# Patient Record
Sex: FEMALE | Race: WHITE | HISPANIC OR LATINO | Employment: FULL TIME | URBAN - METROPOLITAN AREA
[De-identification: names, ages, dates, MRNs, and addresses within clinical notes are randomized per-mention and may not be internally consistent; named-entity substitution may affect disease eponyms.]

---

## 2018-10-23 ENCOUNTER — TELEPHONE (OUTPATIENT)
Dept: SPORTS MEDICINE | Facility: CLINIC | Age: 48
End: 2018-10-23

## 2018-10-23 NOTE — TELEPHONE ENCOUNTER
----- Message from Hawa Ramachandran MA sent at 10/22/2018  7:29 PM CDT -----  Can you move this patient off our schedule. They patient can see a PA

## 2018-10-23 NOTE — TELEPHONE ENCOUNTER
Called patient and LVM in regards to the appointment she had set up with us. Asked her to call back when she gets a chance.

## 2018-10-24 ENCOUNTER — OFFICE VISIT (OUTPATIENT)
Dept: SPORTS MEDICINE | Facility: CLINIC | Age: 48
End: 2018-10-24
Payer: COMMERCIAL

## 2018-10-24 ENCOUNTER — HOSPITAL ENCOUNTER (OUTPATIENT)
Dept: RADIOLOGY | Facility: HOSPITAL | Age: 48
Discharge: HOME | End: 2018-10-24
Attending: PHYSICIAN ASSISTANT
Payer: COMMERCIAL

## 2018-10-24 VITALS
HEART RATE: 79 BPM | SYSTOLIC BLOOD PRESSURE: 113 MMHG | DIASTOLIC BLOOD PRESSURE: 71 MMHG | BODY MASS INDEX: 22.22 KG/M2 | WEIGHT: 150 LBS | HEIGHT: 69 IN

## 2018-10-24 DIAGNOSIS — G56.01 CARPAL TUNNEL SYNDROME, RIGHT: ICD-10-CM

## 2018-10-24 DIAGNOSIS — S86.892A LEFT MEDIAL TIBIAL STRESS SYNDROME, INITIAL ENCOUNTER: ICD-10-CM

## 2018-10-24 DIAGNOSIS — M25.532 BILATERAL WRIST PAIN: ICD-10-CM

## 2018-10-24 DIAGNOSIS — M25.531 BILATERAL WRIST PAIN: Primary | ICD-10-CM

## 2018-10-24 DIAGNOSIS — M25.531 BILATERAL WRIST PAIN: ICD-10-CM

## 2018-10-24 DIAGNOSIS — M25.532 BILATERAL WRIST PAIN: Primary | ICD-10-CM

## 2018-10-24 PROCEDURE — 3008F BODY MASS INDEX DOCD: CPT | Mod: CPTII,S$GLB,, | Performed by: PHYSICIAN ASSISTANT

## 2018-10-24 PROCEDURE — 73110 X-RAY EXAM OF WRIST: CPT | Mod: 26,50,, | Performed by: RADIOLOGY

## 2018-10-24 PROCEDURE — 99999 PR PBB SHADOW E&M-EST. PATIENT-LVL IV: CPT | Mod: PBBFAC,,, | Performed by: PHYSICIAN ASSISTANT

## 2018-10-24 PROCEDURE — 73110 X-RAY EXAM OF WRIST: CPT | Mod: 50,TC,FY,PO

## 2018-10-24 PROCEDURE — 99204 OFFICE O/P NEW MOD 45 MIN: CPT | Mod: S$GLB,,, | Performed by: PHYSICIAN ASSISTANT

## 2018-10-24 RX ORDER — MELOXICAM 15 MG/1
15 TABLET ORAL DAILY
Qty: 30 TABLET | Refills: 2 | Status: SHIPPED | OUTPATIENT
Start: 2018-10-24

## 2018-10-24 NOTE — PROGRESS NOTES
Subjective:          Chief Complaint: Yi Smith is a 48 y.o. female who had concerns including Pain of the Left Wrist; Pain of the Right Wrist; and Pain of the Left Lower Leg.    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 She reports training for 6-8 hours a day and started to feel calf pain and wrist pain after playing. When she returned after a month of training she took rest but pain returned after playing for an hour. She reports and is becoming progressively worse. Pain is located over (points to) snuff box and posterior tibial tibialis. She reports that the pain is a 3 /10 aching and throbbing pain today and not responding adequately to conservative measures which have included activity modifications, dry needling, rest, and oral medication (2 weeks). Is affecting ADLs and limiting desired level of activity. Denies numbness, tingling, radiation, and inability to bear weight. Denies neck pain.  Pain is 10 /10 at its worst    Mechanical symptoms: none  Subjective instability: (--)   orse with impact activity  Better with rest.   Nocturnal symptoms: (--)    No previous surgeries or trauma on wrist or calf              Review of Systems   Constitution: Negative for chills and fever.   HENT: Negative for congestion and sore throat.    Eyes: Negative for discharge and double vision.   Cardiovascular: Negative for chest pain, palpitations and syncope.   Respiratory: Negative for cough and shortness of breath.    Endocrine: Negative for cold intolerance and heat intolerance.   Skin: Negative for dry skin and rash.   Musculoskeletal: Positive for joint pain and joint swelling. Negative for falls, gout, muscle cramps, muscle weakness, myalgias and neck pain.   Gastrointestinal: Negative for abdominal pain, nausea and vomiting.   Neurological: Negative for focal weakness, numbness and paresthesias.       Pain Related Questions  Over the past 3  days, what was your average pain during activity? (I.e. running, jogging, walking, climbing stairs, getting dressed, ect.): 10  Over the past 3 days, what was your highest pain level?: 10  Over the past 3 days, what was your lowest pain level? : 3    Other  How many nights a week are you awakened by your affected body part?: 1  Was the patient's HEIGHT measured or patient reported?: Patient Reported  Was the patient's WEIGHT measured or patient reported?: Measured      Objective:        General: Yi  is well-developed, well-nourished, appears stated age, in no acute distress, alert and oriented to time, place and person.     General    Vitals reviewed.  Constitutional: She is oriented to person, place, and time. She appears well-developed and well-nourished. No distress.   HENT:   Head: Normocephalic and atraumatic.   Nose: Nose normal.   Eyes: Conjunctivae and EOM are normal. Pupils are equal, round, and reactive to light.   Cardiovascular: Normal rate, regular rhythm and intact distal pulses.    Pulmonary/Chest: Breath sounds normal. No respiratory distress.   Abdominal: Soft. Bowel sounds are normal. She exhibits no distension.   Neurological: She is alert and oriented to person, place, and time. She has normal reflexes.   Psychiatric: She has a normal mood and affect. Her behavior is normal. Judgment and thought content normal.     General Musculoskeletal Exam   Gait: normal     Left Ankle/Foot Exam     Range of Motion   Ankle Joint  Dorsiflexion: normal   Plantar flexion: normal     Subtalar Joint   Inversion: normal   Eversion: normal   Talbert Test:  normal    Tests   Heel Walk: able to perform  Tiptoe Walk: able to perform  Single Heel Rise: able to perform  Squeeze Test: absent    Other   Sensation: normal  Peroneal Subluxation: negative    Comments:  +TTP over posterior tibial tibialis      Right Hand/Wrist Exam     Pain   Wrist - The patient exhibits pain of the scapholunate/lunate  ECU.    Tenderness   The patient is tender to palpation of the snuff box.    Tests   Phalens sign: positive  Tinel's sign (median nerve): positive  Carpal Tunnel Compression Test: positive        Left Hand/Wrist Exam     Pain   Wrist - The patient exhibits pain of the scapholunate/lunate ECU.    Tenderness   The patient is tender to palpation of the snuff box.     Tests   Phalens sign: positive  Carpal Tunnel Compression Test: positive            Muscle Strength   Right Upper Extremity   : 5/5/5   Left Upper Extremity  :  5/5/5     Vascular Exam       Left Pulses  Dorsalis Pedis:      2+  Posterior Tibial:      2+        Edema  Left Lower Leg: absent    Radiographic Findings 10/24/2018:    XR WRIST COMPLETE 3 VIEWS BILATERAL    CLINICAL HISTORY:  Pain in right wrist    TECHNIQUE:  PA, lateral, and oblique views of both wrists were performed.    COMPARISON:  None    FINDINGS:  On the right mild narrowing at the radiocarpal joint.  Otherwise alignment is satisfactory.    On the left alignment is satisfactory.  No fracture.    Xrays of the bilateral wrist were ordered and reviewed by me today. These findings were discussed and reviewed with the patient.          Assessment:       Encounter Diagnoses   Name Primary?    Bilateral wrist pain Yes    Left medial tibial stress syndrome, initial encounter     Carpal tunnel syndrome, right           Plan:       1. Mobic 15 mg 1 time daily PRN for pain management. Patient understands to take with food and/or OTC prilosec to decrease GI side effects.  2. Ambulatory referral to physical therapy for shin splints protocol.  3. [facility], Dr. Blount for carpel tunnel evaluation.  4. Ice compress to the affected area 2-3x a day for 15-20 minutes as needed for pain management.  5. RTC to see Ashkan Rogel PA-C as needed for follow-up.      All of the patient's questions were answered and the patient will contact us if they have any questions or  concerns in the interim.          Patient questionnaires may have been collected.

## 2018-10-29 ENCOUNTER — TELEPHONE (OUTPATIENT)
Dept: SPORTS MEDICINE | Facility: CLINIC | Age: 48
End: 2018-10-29

## 2018-10-29 NOTE — TELEPHONE ENCOUNTER
Spoke with Pt to give her the phone number to Central Scheduling for Physical Therapy in order to schedule with Larry  and the contact number for [facility] to schedule with Dr. Blount.

## 2018-11-01 ENCOUNTER — OFFICE VISIT (OUTPATIENT)
Dept: ORTHOPEDICS | Facility: CLINIC | Age: 48
End: 2018-11-01
Payer: COMMERCIAL

## 2018-11-01 ENCOUNTER — CLINICAL SUPPORT (OUTPATIENT)
Dept: REHABILITATION | Facility: HOSPITAL | Age: 48
End: 2018-11-01
Attending: PHYSICIAN ASSISTANT
Payer: COMMERCIAL

## 2018-11-01 VITALS
HEIGHT: 69 IN | WEIGHT: 149.94 LBS | SYSTOLIC BLOOD PRESSURE: 104 MMHG | BODY MASS INDEX: 22.21 KG/M2 | DIASTOLIC BLOOD PRESSURE: 62 MMHG

## 2018-11-01 DIAGNOSIS — M79.662 PAIN OF LEFT LOWER LEG: ICD-10-CM

## 2018-11-01 DIAGNOSIS — M62.81 MUSCLE WEAKNESS OF LOWER EXTREMITY: ICD-10-CM

## 2018-11-01 DIAGNOSIS — M25.531 RIGHT WRIST PAIN: Primary | ICD-10-CM

## 2018-11-01 PROCEDURE — 97161 PT EVAL LOW COMPLEX 20 MIN: CPT | Performed by: PHYSICAL THERAPIST

## 2018-11-01 PROCEDURE — 3008F BODY MASS INDEX DOCD: CPT | Mod: CPTII,S$GLB,, | Performed by: ORTHOPAEDIC SURGERY

## 2018-11-01 PROCEDURE — 99203 OFFICE O/P NEW LOW 30 MIN: CPT | Mod: 25,S$GLB,, | Performed by: ORTHOPAEDIC SURGERY

## 2018-11-01 PROCEDURE — 20605 DRAIN/INJ JOINT/BURSA W/O US: CPT | Mod: RT,S$GLB,, | Performed by: ORTHOPAEDIC SURGERY

## 2018-11-01 PROCEDURE — 97110 THERAPEUTIC EXERCISES: CPT | Performed by: PHYSICAL THERAPIST

## 2018-11-01 PROCEDURE — 99999 PR PBB SHADOW E&M-EST. PATIENT-LVL III: CPT | Mod: PBBFAC,,, | Performed by: ORTHOPAEDIC SURGERY

## 2018-11-01 RX ORDER — DEXAMETHASONE SODIUM PHOSPHATE 4 MG/ML
4 INJECTION, SOLUTION INTRA-ARTICULAR; INTRALESIONAL; INTRAMUSCULAR; INTRAVENOUS; SOFT TISSUE
Status: DISCONTINUED | OUTPATIENT
Start: 2018-11-01 | End: 2018-11-01 | Stop reason: HOSPADM

## 2018-11-01 RX ADMIN — DEXAMETHASONE SODIUM PHOSPHATE 4 MG: 4 INJECTION, SOLUTION INTRA-ARTICULAR; INTRALESIONAL; INTRAMUSCULAR; INTRAVENOUS; SOFT TISSUE at 12:11

## 2018-11-01 NOTE — PLAN OF CARE
"[facility]  Physical Therapy Initial Evaluation    Name: Yi Smith  Clinic Number: [id number]    Therapy Diagnosis:   Encounter Diagnoses   Name Primary?    Pain of left lower leg     Muscle weakness of lower extremity      Physician: Chadwick Rogel, *    Physician Orders: PT Eval and Treat   Medical Diagnosis: S86.892A (ICD-10-CM) - Left medial tibial stress syndrome, initial encounter  Evaluation Date: 11/1/2018  Authorization Period Expiration: 12/31/2018  Plan of Care Certification Period:  2/1/2019  Visit # / Visits authorized: 1 / 20    Time In: 13:30  Time Out: 14:30  Total Billable Time: 60 minutes    Precautions: Standard    Subjective   Date of onset: 3/2018  History of current condition - Thao reports attending tennis camp at [facility] in [place] for 5 days, 8 hours / day,  Noting that on day 4 she felt "cramping" in her L calf, went to "nursery" for examination, iced after practice and was able to complete day 5 activities, however, since returning home, sxs have persisted, she did see DC who performed US, and held her out of tennis, pt also attended PT x 2 visits receiving dry needling which made her condition worsen, she has had multiple episodes again what she describes as a "cramp" descending stairs, MRI (-) and now referred to this PT for consult      Pt currently returned to tennis 3x/week, but after further conversation, it turns out pt also works out weight training in morning at [facility], "whole body"  So basically doing two a day workouts, and was also getting in sauna     No past medical history on file.  Yi Smith  has no past surgical history on file.    Yi  has a current medication list which includes the following prescription(s): meloxicam.    Review of patient's allergies indicates:  No Known Allergies     Pain:  Current 0/10, worst 0/10, best 0/10   Location: left calf region    Description: Aching, Sharp and "It " "feels like it's going to cramp" very apprehensive   Aggravating Factors: tennis   Easing Factors: rest    Prior Therapy: yes  Social History:  NA   Occupation:   Prior Level of Function: I  Current Level of Function: I but restrictions for rec/leisure activity     Pts goals: "heal as fast as I can"     Objective     Observation:  Unremarkable, no swelling, no muscle atrophy, neg gait deviation  There may be small bruise present in medial head of gastroc upper to mid 1/3 region     Range of Motion (Passive):   Ankle Right Left   Dorsiflexion WFLs  WFLs   Plantarflexion WFLs WFLs   Inversion  WFLs WFLs   Eversion WFLs WFLs     Strength: Ankle   Right Left Comment   Dorsiflexion 5/5 5/5    Plantarflexion 7 U HR  3 UR  Poor to fair quality and k' bent on each leg   Inversion 5/5 5/5    Eversion  5/5    5/5         Special Tests: neg Talbert test, fair balance L worse than R  Decreased flexibility B RF, (+) U bridge test R/L     Palpation: (-) TTP t/o left gastroc, pt feels she is tender along tibial region medially     TREATMENT     Treatment Time In: 14:00  Treatment Time Out: 14:30   Total Treatment time separate from Evaluation time:30'    Thao received therapeutic exercises to develop strength, endurance, flexibility and core stabilization for 30 minutes including:    Stick lower leg  U HR 3x10 R/L   Supine RF stretch 5x;15 R/L  Butt winking 1x15:05   B bridge 2x10;10   Plank 3x:20     rev'd hydration  rev'd not performing 2 a day work outs, pt to play tennis M,W, F and work out T, Th, Sat, Sun off  rev'd not using sauna or hot tub x 2 weeks     Education     Home Exercises and Patient Education Provided    Education provided re:   - progress towards goals   - role of therapy in multi - disciplinary team, goals for therapy  No spiritual or educational barriers to learning provided    Written Home Exercises Provided:   Exercises were reviewed and Thao was able to demonstrate them prior to " "the end of the session.   Pt received a written copy of exercises to perform at home. Thao demonstrated good  understanding of the education provided.     Assessment   Yi  is a 47 y.o. female referred to outpatient Physical Therapy with a medical diagnosis of medial tibial stress syndrome. Pt presents with     "cramping" feeling in left gastroc  Decreased flexibility B hips  Decreased strength/activation gastroc and glut    Pt prognosis is Good.   Pt will benefit from skilled outpatient Physical Therapy to address the deficits stated above and in the chart below, provide pt/family education, and to maximize pt's level of independence.     Plan of care discussed with patient: Yes  Pt's spiritual, cultural and educational needs considered and pt agreeable to plan of care and goals as stated below:     Anticipated Barriers for therapy: none    Medical Necessity is demonstrated by the following  History  Co-morbidities and personal factors that may impact the plan of care Co-morbidities:   none    Personal Factors:   no deficits     low   Examination  Body Structures and Functions, activity limitations and participation restrictions that may impact the plan of care Body Regions:   lower extremities    Body Systems:    strength  balance    Participation Restrictions:   Tennis     Activity limitations:   Mobility  no deficits    Self care  no deficits    Domestic Life  no deficits    Community and Social Life  recreation and leisure         low   Clinical Presentation stable and uncomplicated low   Decision Making/ Complexity Score: low     Goals     Short-Term Goals: 6 weeks  - The patient will be independent with initial home exercise program.  -  The patient will increase PF strength to be able to perform 10 U HRs   Long-Term Goals: 12 weeks  - The patient will be independent with home exercise program and symptom management.  - The patient will be independent amb with no assistive device on all surfaces for " community distances.  -- The patient will increase strength in glut/core to be able to perform U bridge without hamstring activation to perform tennis  with pain < 0/10  The patient will be able to perform SLS EC > 10 seconds to return to her 100% preinjury level of function and decrease her fall risk .      Plan   Certification Period: 11/1/2018 to 2/1/2019.    Outpatient Physical Therapy 1 times weekly for 3 months to include the following interventions: patient education, Manual Therapy, Moist Heat/ Ice and Neuromuscular Re-ed. therex and therapeuitc activities      Larry Stone, PT

## 2018-11-01 NOTE — PROCEDURES
Intermediate Joint Aspiration/Injection: R radiocarpal  Date/Time: 11/1/2018 12:12 PM  Performed by: Tae Blount MD  Authorized by: Tae Blount MD     Consent Done?: Yes (Verbal)  Indications: Pain  Site marked: The procedure site was marked    Timeout: Prior to procedure the correct patient, procedure, and site was verified      Location:  Wrist  Site:  R radiocarpal  Prep: Patient was prepped and draped in usual sterile fashion    Ultrasonic Guidance for needle placement: No  Needle size:  25 G  Approach:  Dorsal  Medications:  4 mg dexamethasone 4 mg/mL  Patient tolerance:  Patient tolerated the procedure well with no immediate complications

## 2018-11-01 NOTE — PROGRESS NOTES
[facility]  History & Physical  Orthopedics    SUBJECTIVE:      Chief Complaint: Right Wrist Pain    Referring Provider: [facility]       History of Present Illness:  Patient is a 47 y.o. right hand dominant female who presents today with complaints of dorsal right wrist pain. The pain started in March while at [place], [place]. She reports training for 6-8 hours a day.  Since then she has been playing on and off having to stop for extended periods because of the pain.   She reports and is becoming progressively worse. She reports that the pain is a 3 /10 aching and throbbing pain today and not responding adequately to conservative measures which have included activity modifications, dry needling, rest, and oral medication (2 weeks).  She does also mention that occasionally when she wakes up she has tingling in her long and index finger of her right hand.  Pain is 10 /10 at its worst    Onset of symptoms/DOI was March.    Symptoms are aggravated by activity.    Symptoms are alleviated by rest.    Symptoms consist of pain.    The patient rates their pain as a 3/10.    Attempted treatment(s) and/or interventions include rest, activity modification and anti-inflammatory medications.     The patient denies any fevers, chills, N/V, D/C and presents for evaluation.       No past medical history on file.  No past surgical history on file.  Review of patient's allergies indicates:  No Known Allergies  Social History     Social History Narrative    Not on file     No family history on file.      Current Outpatient Medications:     meloxicam (MOBIC) 15 MG tablet, Take 1 tablet (15 mg total) by mouth once daily., Disp: 30 tablet, Rfl: 2      Review of Systems:  Constitutional: no fever or chills  Eyes: no visual changes  ENT: no nasal congestion or sore throat  Respiratory: no cough or shortness of breath  Cardiovascular: no chest pain  Gastrointestinal: no nausea or vomiting, tolerating  "diet  Musculoskeletal: myalgias    OBJECTIVE:      Vital Signs (Most Recent):  Vitals:    11/01/18 1114   BP: 104/62   BP Location: Right arm   Patient Position: Sitting   BP Method: Medium (Automatic)   Weight: 68 kg (149 lb 14.6 oz)   Height: 5' 9" (1.753 m)     Body mass index is 22.14 kg/m².      Physical Exam:  Constitutional: The patient appears well-developed and well-nourished. No distress.   Head: Normocephalic and atraumatic.   Nose: Nose normal.   Eyes: Conjunctivae and EOM are normal.   Neck: No tracheal deviation present.   Cardiovascular: Normal rate and intact distal pulses.    Pulmonary/Chest: Effort normal. No respiratory distress.   Abdominal: There is no guarding.   Neurological: The patient is alert.   Psychiatric: The patient has a normal mood and affect.     Right Hand/Wrist Examination:    Observation/Inspection:  Swelling  none    Deformity  none  Discoloration  none     Scars   none    Atrophy  none    HAND/WRIST EXAMINATION:  Finkelstein's Test   Neg  Snuff box tenderness   Neg  Tinajero's Test    Neg  Hook of Hamate Tenderness  Neg  CMC grind    Neg  Circumduction test   Neg    Neurovascular Exam:  Digits WWP, brisk CR < 3s throughout  NVI motor/LTS to M/R/U nerves, radial pulse 2+  Tinel's Test - Carpal Tunnel  Neg  Tinel's Test - Cubital Tunnel  Neg  Phalen's Test    Neg  Durkan's    POSITIVE    ROM hand/wrist/elbow full, painless      Diagnostic Results:     Xray - No acute fracture      ASSESSMENT/PLAN:      47 y.o. yo female with mostly dorsal right wrist pain that does radiate to the volar wrist and hand likely de quervain's tenosynovitis.    1) We will do a steroid injection today as the patient is going to [place] and would like to have some relief before playing tennis there.  2) When she comes back we will get her started in some OT  3) Continue bracing and NSAIDs        Tae Blount M.D.      "

## 2018-11-12 ENCOUNTER — CLINICAL SUPPORT (OUTPATIENT)
Dept: REHABILITATION | Facility: HOSPITAL | Age: 48
End: 2018-11-12
Attending: ORTHOPAEDIC SURGERY
Payer: COMMERCIAL

## 2018-11-12 ENCOUNTER — CLINICAL SUPPORT (OUTPATIENT)
Dept: REHABILITATION | Facility: HOSPITAL | Age: 48
End: 2018-11-12
Attending: PHYSICIAN ASSISTANT
Payer: COMMERCIAL

## 2018-11-12 DIAGNOSIS — M62.81 MUSCLE WEAKNESS OF LOWER EXTREMITY: ICD-10-CM

## 2018-11-12 DIAGNOSIS — M79.662 PAIN OF LEFT LOWER LEG: Primary | ICD-10-CM

## 2018-11-12 DIAGNOSIS — M79.601 RIGHT ARM PAIN: Primary | ICD-10-CM

## 2018-11-12 PROCEDURE — 97110 THERAPEUTIC EXERCISES: CPT | Performed by: PHYSICAL THERAPIST

## 2018-11-12 PROCEDURE — 97165 OT EVAL LOW COMPLEX 30 MIN: CPT

## 2018-11-12 PROCEDURE — 97110 THERAPEUTIC EXERCISES: CPT

## 2018-11-12 NOTE — PROGRESS NOTES
"  Physical Therapy Daily Treatment Note     Visit Date: 11/12/2018    Name: Yi Smith  Clinic Number: [id number]    Therapy Diagnosis:   Encounter Diagnoses   Name Primary?    Pain of left lower leg Yes    Muscle weakness of lower extremity      Physician: Chadwick Rogel, *      Physician Orders: PT Eval and Treat   Medical Diagnosis: S86.892A (ICD-10-CM) - Left medial tibial stress syndrome, initial encounter  Evaluation Date: 11/1/2018  Authorization Period Expiration: 12/31/2018  Plan of Care Certification Period:  2/1/2019  Visit # / Visits authorized: 1 / 20    Time In: 13:30  Time Out: 14:30  Total Billable Time: 60 minutes    Precautions: Standard    Subjective      Pt reports:improvement in her L calf condition, now able to play for 1 hour without sxs in calf region     she was compliant with home exercise program given last session.   Response to previous treatment:good   Functional change: see above     Pain: 0/10  Location: left gastroc region       Objective     Measurements taken:  Still with difficulty performing single leg calf raises    Thao received therapeutic exercises to develop strength, endurance, ROM and flexibility for 45 minutes including:    Stick upper/ lower leg  Functional GSS 2x:05 R/L  "  SS 2x5:05 R/L  U HR 3xburn  Seated HR MR 3x15  A' TB DF green 3xburn   A' TB Inv 2x30  A' TB Ev 3xburn   U bridge 3x5:05   Hip abd band walks 3xburn R/L green loop band       Home Exercises Provided and Patient Education Provided     Education provided:   - none    Written Home Exercises Provided: Patient instructed to cont prior HEP.  Exercises were reviewed and hTao was able to demonstrate them prior to the end of the session.  Thao demonstrated good  understanding of the education provided.     See EMR under hand out  for exercises provided prior visit.      Assessment     swetha Rx without increase in sxs very good training effect in L gastroc region     Thao " is progressing well towards her goals.   Pt prognosis is Good.     Pt will continue to benefit from skilled outpatient physical therapy to address the deficits listed in the problem list box on initial evaluation, provide pt/family education and to maximize pt's level of independence in the home and community environment.     Pt's spiritual, cultural and educational needs considered and pt agreeable to plan of care and goals.    Anticipated barriers to physical therapy: none    Goals:     Short-Term Goals: 6 weeks  - The patient will be independent with initial home exercise program.  -  The patient will increase PF strength to be able to perform 10 U HRs   Long-Term Goals: 12 weeks  - The patient will be independent with home exercise program and symptom management.  - The patient will be independent amb with no assistive device on all surfaces for community distances.  -- The patient will increase strength in glut/core to be able to perform U bridge without hamstring activation to perform tennis  with pain < 0/10  The patient will be able to perform SLS EC > 10 seconds to return to her 100% preinjury level of function and decrease her fall risk .      Plan     Continue with established Plan of Care towards PT goals with focus on decreasing pain, increasing ROM, strength, neuromuscular control and functional status       Larry Stone, PT

## 2018-11-14 NOTE — PLAN OF CARE
[facility] Occupational Therapy  Initial Evaluation     Name: Yi Smith  Clinic Number: [id number]    Therapy Diagnosis:  Right wrist pain   Encounter Diagnosis   Name Primary?    Right arm pain Yes     Physician: Tae Blount MD    Physician Orders: right wrist pain , eval and treat   Medical Diagnosis: wrist pain , right   Surgical Procedure/ Date :none   Evaluation Date: 11/12/2018  Insurance:[facility]  Insurance Authorization period Expiration: 12/31/18   Plan of Care Certification Period: 12/31/18     Visit # / Visits Authortized: 1/20  Time In:11;00  Time Out: 12:00  Total Billable Time: 60 minutes    Precautions: Standard    Subjective     Involved Side:  right  Dominant Side: Right  Date of Onset:   2 months ago   Mechanism of Injury: pt reports that 48yo former professional raquetball player, 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  Went to [facility] to learn the sport and played 8 hours a day x 5 days.  New onset right wrist pain thereafter.  Had MRI at outside facility reportedly negative.  Chiropractor treatment helped temporarily.  MD reports pt  with CT symptoms, (+) anjelica's on right but does not want EMG at this time.  Will try wrist injection, brace, OT referral.  Cont mobic.  F/U 2 months.        History of Current Condition:  She has dropped down to 2 times a week to play tennis .She reports that the pain is a 3 /10 aching and throbbing pain today and not responding adequately to conservative measures which have included activity modifications, dry needling, rest, and oral medication (2 weeks).  She does also mention that occasionally when she wakes up she has tingling in her long and index finger of her right hand.  Pain is 10 /10 at its worst      Imaging: MRI studies outside facility   Previous Therapy: had dry needling     Patient's Goals for Therapy: pain free to be able to play intense tennis     Pain:  Functional Pain Scale Rating 0-10:   10/10 on average  8/10 at  best  10/10 at worst  Locationright: dorsal wrist   Description: Throbbing  Aggravating Factors: Bending  Easing Factors: relaxation    Occupation:   Plays tennis and just started piano   Working presently: unemployed  Duties:  Plays tennis several days a week      Functional Limitations/Social History:    Previous functional status includes: Independent with all ADLs.     Current FunctionalStatus   Home/Living environment : lives with their family      Limitation of Functional Status as follows:   ADLs/IADLs:     - Feeding:Not Necessary    - Bathing:Not Necessary    - Dressing/Grooming: Not Necessary    - Driving: Not Necessary     Leisure: Sports: tennis       Past Medical History/Physical Systems Review:   Yi Smith  has no past medical history on file.    Yi Smith  has no past surgical history on file.    Yi  has a current medication list which includes the following prescription(s): meloxicam.    Review of patient's allergies indicates:  No Known Allergies       Objective     Observation/Appearance:  Skin intact     Edema. Measured in centimeters. No edema noted       Hand ROM. Measured in degrees.   11/12/2018 11/12/2018       Left Right              Index: MP   wnl/                 PIP      wnl/                 DIP  wnl/                 CONNOR                Long:  MP  wnl                 PIP  wnl                 DIP  wnl                 CONNOR                Ring:   MP  wnl                 PIP  wnl                 DIP  wnl                 CONNOR                Small:  MP  wnl                  PIP  wnl                  DIP  wnl                 CONNOR                Thumb: MP  wnl                   IP  wnl          Rad ADD/ABD  wnl          Pal ADD/ABD  wnl          Opposition                  WRIST          flexion  55      Ext   55      RD  20      UD  20      Pro  90      Sup   90                             Sensation  Median Nerve Distribution 11/12/2018 11/12/2018    Left  Right   [place] Justino     Normal 1.65-2.83 X X   Diminished Light Touch 3.22-3.61     Diminished Protective 3.84-4.31     Loss of Protective 4.56-6.65     Untestable >6.65     2 Point Discrimination     Static     Dynamic       Special Tests:   Right   11/12/2018   Thumb CMC Grind Test    Finkelstein's Test    Phalen's Test    Tinel's Test X   Vincent's Test    Extrinsic Tightness Test    Intrinsic Tightness Test    ORL Test    Froment's Sign    Mamie's Sign     E[place]awa Sign     Clamp Sign     Scaphoid Thrust Test    Linscheid's Test    Metacarpal Stress Test    Piano Key Test    ECU Synergy Test    Ulnar Compression Test    TFCC Load Test    Ulnocarpal Stress Test    Midcarpal Shift Test    Pisiform Boost Test         Strength (Dyanmometer) and Pinch Strength (Pinch Gauge)  Measured in pounds and psi. Average of three trials.   11/12/2018 11/12/2018        Left Right        Rung II 55 58       [place] Pinch 12 10       3pt Pinch 8 8       2pt Pinch 7 7           CMS Impairment/Limitation/Restriction for FOTO initial  Survey    Therapist reviewed FOTO scores for Yi Smith on 11/12/2018.   FOTO documents entered into [employer] - see Media section.                    11/12/2018     Category: Self care         Current : CK = at least 40% but < 60% impaired, limited or restricted  Goal: CJ = at least 20% but < 40% impaired, limited or restricted  Discharge:          Treatment     Treatment Time In: 11;30  Treatment Time Out: 12:00  Total Treatment time separate from Evaluation time:30  Discussed options with patient and use of iontophoresis , pt is going for the next 2-3 weeks to [place] , so we discussed that it would be best to start therapy 1-2times a week for 3 weeks in a row ,   She already has wrist brace she will bring it next visit .   She is adamant that she cannot stop playing tennis and that in [place] she plans to play and take some more  Lessons.      Thao received therapeutic exercises  for 10 minutes including:  -AROM wrist , use of brace modification  With  Tennis form and .    discussed course of session would be ionto , bracing and reduction in playing , would start ionto after her vacation .      Home Exercise Program/Education:  Issued HEP:  ROM , use of brace       and educated on modality use for pain management . Exercises were reviewed and Thao was able to demonstrate them prior to the end of the session.   Pt received a written copy of exercises to perform at home. Thao demonstrated good  understanding of the education provided.  Pt was advised to perform these exercises free of pain, and to stop performing them if pain occurs.    Patient/Family Education: role of OT, goals for OT, scheduling/cancellations - pt verbalized understanding. Discussed insurance limitations with patient.    Additional Education provided: see above     Assessment     Yi Smith is a 47 y.o. female referred to outpatient occupational/hand therapy and presents with a medical diagnosis of  Wrist pain , possible ECU tendonitis s, resulting in pain with wrist motions , limiting tennis game  and demonstrates limitations as described in the chart below. Following  medical record review it is determined that pt will benefit from occupational therapy services in order to maximize pain free and/or functional use of right hand .     The patient's rehab potential is Good.     Anticipated barriers to occupational therapy: none   Pt has no cultural, educational or language barriers to learning provided.    Profile and History Assessment of Occupational Performance Level of Clinical Decision Making Complexity Score   Occupational Profile:   Yi Smith is a 47 y.o. female who lives with their family and is currently unemployed . Yi Smith has difficulty with  Dressing, fine motor to manage clothing   driving/transportation management  affecting his/her daily  functional abilities. His/her main goal for therapy is  Pain free to play tennis .     Comorbidities:    has no past medical history on file.    Medical and Therapy History Review:   Brief               Performance Deficits    Physical:  Muscle Power/Strength    Cognitive:  No Deficits    Psychosocial:    No Deficits     Clinical Decision Making:  low    Assessment Process:  Problem-Focused Assessments    Modification/Need for Assistance:  Not Necessary    Intervention Selection:  Limited Treatment Options       low  Based on PMHX, co morbidities , data from assessments and functional level of assistance required with task and clinical presentation directly impacting function.       The following goals were discussed with the patient and patient is in agreement with them as to be addressed in the treatment plan.          GOALS: 6  weeks. Pt agrees with goals set.  Goals:     Short Term (4  weeks on 12/13/18 ):  1)   Patient to be IND with HEP and modalities for pain management, progressing     3)   Increase  strength by  5 lbs. to grasp right hand , progressing   4)   Increase pinch 1-3 psis for  2, progressing   5)   Decrease edema .1-2cm to increase joint mobility /flexibility for improved overall functional hand use. , progressing       Long Term (by discharge):  1)   Pt will report 3 out of 10 pain with right wrist ., progressing   2)   Patient to score of CJ on FOTO to demonstrate improved perception of functionalright hand/ arm  Use. Progressing   3)   Pt will return to prior level of function for ADLs and household management, progressing              Plan   Certification Period/Plan of care expiration: 11/12/2018 to 12/31/18 .    Outpatient Occupational Therapy 2 times weekly for 3 weeks may include the following interventions: Iontophoresis with 2.0 cc Dexamethasone and Strengthening.      Jennifer Rodriguez, OT

## 2018-11-16 ENCOUNTER — CLINICAL SUPPORT (OUTPATIENT)
Dept: REHABILITATION | Facility: HOSPITAL | Age: 48
End: 2018-11-16
Attending: PHYSICIAN ASSISTANT
Payer: COMMERCIAL

## 2018-11-16 PROCEDURE — 97110 THERAPEUTIC EXERCISES: CPT | Performed by: PHYSICAL THERAPIST

## 2018-11-16 NOTE — PROGRESS NOTES
"  Physical Therapy Daily Treatment Note     Visit Date: 11/16/2018    Name: Yi Smith  Clinic Number: [id number]    Therapy Diagnosis:   Left lower leg pain   Muscle weakness lower extremity     Physician: Chadwick Rogel, *      Physician Orders: PT Eval and Treat   Medical Diagnosis: S86.892A (ICD-10-CM) - Left medial tibial stress syndrome, initial encounter  Evaluation Date: 11/1/2018  Authorization Period Expiration: 12/31/2018  Plan of Care Certification Period:  2/1/2019  Visit # / Visits authorized: 3 / 20    Time In: 11:00  Time Out: 12:00  Total Billable Time: 45  minutes    Precautions: Standard    Subjective      Pt reports muscle soreness after LV but continues to progress :     she was compliant with home exercise program given last session.   Response to previous treatment:good   Functional change: see above     Pain:   0 /10  Location: left gastroc region       Objective     Measurements taken:   5 U HR     Thao received therapeutic exercises to develop strength, endurance, ROM and flexibility for 45 minutes including:    Stick upper/ lower leg  Functional GSS 2x:05 R/L  "  SS 2x5:05 R/L  U HR 3xburn  Seated HR MR 3x15  pilates reformer "ankling" 2x30 2 springs   U bridge 3x5:05   Hip abd band walks 3xburn R/L green loop band       Home Exercises Provided and Patient Education Provided     Education provided:   - none    Written Home Exercises Provided: HEP per fhis visit Dada  Exercises were reviewed and Thao was able to demonstrate them prior to the end of the session.  Thao demonstrated good  understanding of the education provided.     See EMR under  for exercises provided prior visit.      Assessment     swetha Rx without increase in sxs   Muscle fatigue noted in L lower leg    Thao is progressing well towards her goals.   Pt prognosis is Good.     Pt will continue to benefit from skilled outpatient physical therapy to address the deficits listed in the problem " list box on initial evaluation, provide pt/family education and to maximize pt's level of independence in the home and community environment.     Pt's spiritual, cultural and educational needs considered and pt agreeable to plan of care and goals.    Anticipated barriers to physical therapy: none    Goals:     Short-Term Goals: 6 weeks  - The patient will be independent with initial home exercise program.  -  The patient will increase PF strength to be able to perform 10 U HRs   Long-Term Goals: 12 weeks  - The patient will be independent with home exercise program and symptom management.  - The patient will be independent amb with no assistive device on all surfaces for community distances.  -- The patient will increase strength in glut/core to be able to perform U bridge without hamstring activation to perform tennis  with pain < 0/10  The patient will be able to perform SLS EC > 10 seconds to return to her 100% preinjury level of function and decrease her fall risk .      Plan     Pt to go to [place] for 1 month and will return to PT when she gets back home   Continue with established Plan of Care towards PT goals with focus on decreasing pain, increasing ROM, strength, neuromuscular control and functional status       Larry Stone, PT

## 2019-01-28 ENCOUNTER — DOCUMENTATION ONLY (OUTPATIENT)
Dept: REHABILITATION | Facility: HOSPITAL | Age: 49
End: 2019-01-28

## 2019-01-28 NOTE — PROGRESS NOTES
Occupational therapy      Pt only seen 1 time for initial eval on 11/12/18 . Pt did not need schedule any additional appointments . Initial eval  Of 11/12.18 to serve as discharge status .

## 2023-03-10 ENCOUNTER — TELEPHONE (OUTPATIENT)
Dept: SPORTS MEDICINE | Facility: CLINIC | Age: 53
End: 2023-03-10
Payer: COMMERCIAL

## 2023-03-10 NOTE — TELEPHONE ENCOUNTER
Called patient to schedule appointment, but did not answer. Voicemail box not set up so I'm unable to LVM.

## 2023-03-10 NOTE — TELEPHONE ENCOUNTER
Called patient regarding scheduling of appointment.  Patient didn't answer, but I left a voice mail to call us back.